# Patient Record
Sex: FEMALE | Race: WHITE | Employment: FULL TIME | RURAL
[De-identification: names, ages, dates, MRNs, and addresses within clinical notes are randomized per-mention and may not be internally consistent; named-entity substitution may affect disease eponyms.]

---

## 2017-03-02 DIAGNOSIS — H81.09 MENIERE'S DISEASE, UNSPECIFIED LATERALITY: ICD-10-CM

## 2017-03-02 RX ORDER — HYDROCHLOROTHIAZIDE 12.5 MG/1
12.5 CAPSULE ORAL DAILY
Qty: 30 CAP | Refills: 0 | Status: SHIPPED | OUTPATIENT
Start: 2017-03-02 | End: 2017-04-07

## 2017-04-03 ENCOUNTER — OFFICE VISIT (OUTPATIENT)
Dept: FAMILY MEDICINE CLINIC | Age: 37
End: 2017-04-03

## 2017-04-03 VITALS
DIASTOLIC BLOOD PRESSURE: 62 MMHG | WEIGHT: 217.2 LBS | OXYGEN SATURATION: 99 % | HEART RATE: 82 BPM | SYSTOLIC BLOOD PRESSURE: 108 MMHG | BODY MASS INDEX: 35.06 KG/M2 | RESPIRATION RATE: 18 BRPM

## 2017-04-03 DIAGNOSIS — Z83.3 FAMILY HISTORY OF DIABETES MELLITUS IN MOTHER: ICD-10-CM

## 2017-04-03 DIAGNOSIS — H69.83 EUSTACHIAN TUBE DYSFUNCTION, BILATERAL: ICD-10-CM

## 2017-04-03 DIAGNOSIS — E55.9 VITAMIN D DEFICIENCY: ICD-10-CM

## 2017-04-03 DIAGNOSIS — R79.89 ELEVATED FERRITIN: ICD-10-CM

## 2017-04-03 DIAGNOSIS — Z13.220 LIPID SCREENING: ICD-10-CM

## 2017-04-03 DIAGNOSIS — H65.03 BILATERAL ACUTE SEROUS OTITIS MEDIA, RECURRENCE NOT SPECIFIED: Primary | ICD-10-CM

## 2017-04-03 DIAGNOSIS — M13.0 POLYARTHRITIS: ICD-10-CM

## 2017-04-03 RX ORDER — TOPIRAMATE 25 MG/1
TABLET ORAL 2 TIMES DAILY
COMMUNITY
End: 2017-04-04 | Stop reason: SDUPTHER

## 2017-04-03 RX ORDER — HYDROXYCHLOROQUINE SULFATE 200 MG/1
200 TABLET, FILM COATED ORAL DAILY
COMMUNITY
End: 2019-04-04 | Stop reason: ALTCHOICE

## 2017-04-03 NOTE — PROGRESS NOTES
4/7/2017    Chief Complaint   Patient presents with    Medication Evaluation     wants to discuss vitamin d.     Medication Refill     Albuterol and Topamax    Side Pain     left sided pain that lasts all day. Has happened 3-4 times in the last few weeks. HPI: Charles Mayers is a 39 y.o. female. Has been seeing Elidia Harding. I have seen her in 2014. Presents for f/u of medication refills. Severeal concerns. She is having leg cramps. On HCTZ for Muniere's. There was having some dizziness, no hearing loss. Seen by ENT and put on HCTZ for Muniere's She does not describe true intermittent vertigo, no nausea or vomiting. Will DC HCTZ. Was having some joint pain. Rheumatology labs were low positive for Sjorgen's. She saw a rheumatologist who notes that her labs and history do not strongly support diagnosis of SLE or Sjorogens. She was started on Hydrochloroquine 200mg BID. She iss not having significant joint pain. No dry eyes or dry mouth. Will taper Hydrochloroquine cut dose in 1/2 100mg BID. On Topomax for daily headaches. Thins it may be helping. Will continu and monitor. Had low Vit D on 50,000 units weekly. Check labs. Will DC Dicyclomine and Roperinole. She has been referred to multiple specialists and given a picture of having multiple medical problems. Doubt. Will DC meds as noted and monitor. .     Encourage weight loss. She complains of fullness, decreased hearing in her ears. Some discomfort. No Known Allergies    Current Outpatient Prescriptions   Medication Sig Dispense Refill    hydroxychloroquine (PLAQUENIL) 200 mg tablet Take 0.5 Tabs by mouth two (2) times a day. 30 Tab 1    hydroxychloroquine (PLAQUENIL) 200 mg tablet Take 200 mg by mouth daily.  ergocalciferol (ERGOCALCIFEROL) 50,000 unit capsule 1 pill po twice weekly with food 24 Cap 1    GILDESS 1/20, 21, 1-20 mg-mcg tab Take 1 mg by mouth daily.       omeprazole (PRILOSEC) 20 mg capsule Take 20 mg by mouth daily as needed.  levonorgestrel-ethinyl estradiol (JOLESSA) 0.15-30 mg-mcg per tablet Take  by mouth.  albuterol (PROVENTIL HFA, VENTOLIN HFA, PROAIR HFA) 90 mcg/actuation inhaler Take 1 Puff by inhalation every four (4) hours as needed for Wheezing. 1 Inhaler 5    topiramate (TOPAMAX) 25 mg tablet Take 1 Tab by mouth two (2) times a day. 90 Tab 3    fluticasone (FLONASE) 50 mcg/actuation nasal spray 2 Sprays by Both Nostrils route daily. 3 Bottle 1    DIPHENHYDRAMINE HCL (ALLERGY MEDICATION PO) Take 1 Tab by mouth every morning.  meclizine (ANTIVERT) 25 mg tablet 1/2-1 pills po qid prn vertigo 30 Tab 4       Past Medical History:   Diagnosis Date    Asthma     Migraines     Vertigo        Lab Results   Component Value Date/Time    Hemoglobin A1c 5.2 04/03/2017 08:42 AM    Hemoglobin A1c 5.7 06/01/2016 10:58 AM    Hemoglobin A1c 5.6 08/27/2015 08:29 AM    Glucose 89 04/03/2017 08:42 AM    LDL, calculated 119 06/01/2016 10:58 AM    Creatinine 0.90 04/03/2017 08:42 AM       ROS:  Constitutional: No fever, chills or weight loss  Respiratory: No cough, SOB   CV: No chest pain or Palpitations  GI: No nausea, vomiting or diarrhea. : No dysuria or hematuria. Neuro: No headaches, seizures, change in mental status. Physical Exam:   VS Visit Vitals    /62 (BP 1 Location: Right arm, BP Patient Position: Sitting)    Pulse 82    Resp 18    Wt 217 lb 3.2 oz (98.5 kg)    SpO2 99%    BMI 35.06 kg/m2      General Alert, oriented WF. NAD. Ambulates with steady gait, unassisted. Eyes Conjunctiva and lids normal.    PERRLA, EOMI.   ENMT External ears and nose normal.  Canals normal, TMs mild retraction and vascular dilation along the umbo. No effusion or erythema. Lips, teeth, gums normal, mucous membranes moist.    Oropharynx: no erythema, no exudates, no lesions, normal tongue. NECK Thyroid: normal size, nontender.   Trachea midline, neck symmetrical and without masses. Carotids 2+ with no bruits. No enlarged nodes. RESP Clear to auscultation and percussion. No rales, wheezes, rhonchi, or rubs. CV RRR, with no S3 or S4, no murmur, no rub. GI   Normal bowel sounds, no bruit, soft, nontender, without masses. MSKEL Normal gait and station. Normal strength and tone, no atrophy. EXT No deformity. There are no joint effusions, no erythema, no tenderness, no laxity of hands MCPs, DIPs or PIPs. No tenderness of wrists, elbows or shoulders. No back tenderness or spasm. No redness, swelling or tenderness of hips, knees or feet. Extremities without edema. Bilateral hands: Tinel's is mildly positive. No thenar wasting. Opposition of thumb and 5th finger is strong. DP and PT 2+ bilaterally. SKIN Skin warm, normal turgor. No nodules or rashes. NEURO Cranial nerves normal 2-12. No abnormal movement   PSYCH Judgment and insight good. Oriented to person, place, and time. Affect is alert and attentive. 1. Bilateral acute serous otitis media, recurrence not specified  Reassure. Difficult to treat. She is using Flonase nasal spray. Continue.   - METABOLIC PANEL, COMPREHENSIVE  - AR COLLECTION VENOUS BLOOD,VENIPUNCTURE    2. Eustachian tube dysfunction, bilateral  As above   - METABOLIC PANEL, COMPREHENSIVE  - AR COLLECTION VENOUS BLOOD,VENIPUNCTURE    3. Vitamin D deficiency  Check labs   - METABOLIC PANEL, COMPREHENSIVE  - VITAMIN D, 25 HYDROXY  - AR COLLECTION VENOUS BLOOD,VENIPUNCTURE    4. Polyarthritis  Check labs   - CBC WITH AUTOMATED DIFF  - METABOLIC PANEL, COMPREHENSIVE  - AR COLLECTION VENOUS BLOOD,VENIPUNCTURE    5. Elevated ferritin  Check labs   - CBC WITH AUTOMATED DIFF  - FERRITIN  - METABOLIC PANEL, COMPREHENSIVE  - AR COLLECTION VENOUS BLOOD,VENIPUNCTURE    6. Family history of diabetes mellitus in mother  Her A1Cs have been in the 5.6-5.7%.    Normal.   Discussed the interpretation of A1C and weight management:  Use hands as guide for portion control, improve food selections and increase physical acitivey. - HEMOGLOBIN A1C WITH EAG    7. Lipid screening  Check labs. - LIPID PANEL  - NE COLLECTION VENOUS BLOOD,VENIPUNCTURE    8. Bilateral CTS  No weakness, dropping items. No muscle wasting. Reassure. Monitor. There are no diagnoses linked to this encounter. Orders Placed This Encounter    CBC WITH AUTOMATED DIFF    FERRITIN    METABOLIC PANEL, COMPREHENSIVE    LIPID PANEL    VITAMIN D, 25 HYDROXY    HEMOGLOBIN A1C WITH EAG    NE COLLECTION VENOUS BLOOD,VENIPUNCTURE    hydroxychloroquine (PLAQUENIL) 200 mg tablet     Sig: Take 200 mg by mouth daily.  DISCONTD: topiramate (TOPAMAX) 25 mg tablet     Sig: Take  by mouth two (2) times a day.  hydroxychloroquine (PLAQUENIL) 200 mg tablet     Sig: Take 0.5 Tabs by mouth two (2) times a day. Dispense:  30 Tab     Refill:  1       Follow-up Disposition:  Return in about 2 months (around 6/3/2017).         SCOTT Gill

## 2017-04-03 NOTE — MR AVS SNAPSHOT
Visit Information Date & Time Provider Department Dept. Phone Encounter #  
 4/3/2017  7:00 AM Clair Li NP [facility] [id number] Upcoming Health Maintenance Date Due DTaP/Tdap/Td series (1 - Tdap) 10/28/2000 PAP AKA CERVICAL CYTOLOGY 10/28/2000 INFLUENZA AGE 9 TO ADULT 8/1/2016 Allergies as of 4/3/2017  Review Complete On: 4/3/2017 By: Clair Li NP No Known Allergies Current Immunizations  Never Reviewed No immunizations on file. Not reviewed this visit You Were Diagnosed With   
  
 Codes Comments Bilateral acute serous otitis media, recurrence not specified    -  Primary ICD-10-CM: H65.03 
ICD-9-CM: 381.01 Eustachian tube dysfunction, bilateral     ICD-10-CM: G02.24 ICD-9-CM: 381.81 Vitamin D deficiency     ICD-10-CM: E55.9 ICD-9-CM: 268.9 Polyarthritis     ICD-10-CM: M13.0 ICD-9-CM: 716.50 Elevated ferritin     ICD-10-CM: R79.89 ICD-9-CM: 790.6 Family history of diabetes mellitus in mother     ICD-10-CM: Z80.1 ICD-9-CM: V18.0 Lipid screening     ICD-10-CM: C59.187 ICD-9-CM: V77.91 Vitals BP Pulse Resp Weight(growth percentile) SpO2 BMI  
 108/62 (BP 1 Location: Right arm, BP Patient Position: Sitting) 82 18 217 lb 3.2 oz (98.5 kg) 99% 35.06 kg/m2 OB Status Smoking Status Having regular periods Never Smoker Vitals History BMI and BSA Data Body Mass Index Body Surface Area 35.06 kg/m 2 2.14 m 2 Preferred Pharmacy Pharmacy Name [address], [facility] [phone] Your Updated Medication List  
  
   
This list is accurate as of: 4/3/17  8:49 AM.  Always use your most recent med list.  
  
  
  
  
 albuterol 90 mcg/actuation inhaler Commonly known as:  PROVENTIL HFA, VENTOLIN HFA, PROAIR HFA  
 Take 1 Puff by inhalation every four (4) hours as needed for Wheezing. ALLERGY MEDICATION PO Take 1 Tab by mouth every morning.  
  
 ergocalciferol 50,000 unit capsule Commonly known as:  ERGOCALCIFEROL 1 pill po twice weekly with food  
  
 fluticasone 50 mcg/actuation nasal spray Commonly known as:  Emmette Settles 2 Sprays by Both Nostrils route daily. GILDESS 1/20 (21) 1-20 mg-mcg Tab Generic drug:  norethindrone-ethinyl estradiol Take 1 mg by mouth daily. hydroCHLOROthiazide 12.5 mg capsule Commonly known as:  Gerianne Orquidea Take 1 Cap by mouth daily. hydroxychloroquine 200 mg tablet Commonly known as:  PLAQUENIL Take 200 mg by mouth daily. JOLESSA 0.15 mg-30 mcg 3mpk Generic drug:  levonorgestrel-ethinyl estradiol Take  by mouth.  
  
 meclizine 25 mg tablet Commonly known as:  ANTIVERT  
1/2-1 pills po qid prn vertigo  
  
 omeprazole 20 mg capsule Commonly known as:  PRILOSEC Take 20 mg by mouth daily as needed. TOPAMAX 25 mg tablet Generic drug:  topiramate Take  by mouth two (2) times a day. We Performed the Following CBC WITH AUTOMATED DIFF [56720 CPT(R)] FERRITIN [42160 CPT(R)] HEMOGLOBIN A1C WITH EAG [12365 CPT(R)] LIPID PANEL [94393 CPT(R)] METABOLIC PANEL, COMPREHENSIVE [87410 CPT(R)] RI COLLECTION VENOUS BLOOD,VENIPUNCTURE [id number] CPT(R)] VITAMIN D, 25 HYDROXY [id number] CPT(R)] [facility]! Dear Terri Snyder: Thank you for requesting a [employer] account. Our records indicate that you already have an active [employer] account. You can access your account anytime at [email or web address] Did you know that you can access your hospital and ER discharge instructions at any time in [employer]? You can also review all of your test results from your hospital stay or ER visit. Additional Information If you have questions, please visit the Frequently Asked Questions section of the [employer] website at [email or web address] Remember, [employer] is NOT to be used for urgent needs. For medical emergencies, dial 911. Now available from your iPhone and Android! Please provide this summary of care documentation to your next provider. Your primary care clinician is listed as Lizzeth Cutler. If you have any questions after today's visit, please call [phone].

## 2017-04-04 DIAGNOSIS — R06.02 SOB (SHORTNESS OF BREATH): ICD-10-CM

## 2017-04-04 LAB
ALBUMIN SERPL-MCNC: 4.4 G/DL (ref 3.5–5.5)
ALBUMIN/GLOB SERPL: 1.7 {RATIO} (ref 1.2–2.2)
ALP SERPL-CCNC: 68 IU/L (ref 39–117)
ALT SERPL-CCNC: 60 IU/L (ref 0–32)
AST SERPL-CCNC: 18 IU/L (ref 0–40)
BASOPHILS # BLD AUTO: 0 X10E3/UL (ref 0–0.2)
BASOPHILS NFR BLD AUTO: 0 %
BILIRUB SERPL-MCNC: 0.3 MG/DL (ref 0–1.2)
BUN SERPL-MCNC: 13 MG/DL (ref 6–20)
BUN/CREAT SERPL: 14 (ref 9–23)
CALCIUM SERPL-MCNC: 9.6 MG/DL (ref 8.7–10.2)
CHLORIDE SERPL-SCNC: 105 MMOL/L (ref 96–106)
CO2 SERPL-SCNC: 25 MMOL/L (ref 18–29)
CREAT SERPL-MCNC: 0.9 MG/DL (ref 0.57–1)
EOSINOPHIL # BLD AUTO: 0 X10E3/UL (ref 0–0.4)
EOSINOPHIL NFR BLD AUTO: 0 %
ERYTHROCYTE [DISTWIDTH] IN BLOOD BY AUTOMATED COUNT: 13.8 % (ref 12.3–15.4)
EST. AVERAGE GLUCOSE BLD GHB EST-MCNC: 103 MG/DL
FERRITIN SERPL-MCNC: 184 NG/ML (ref 15–150)
GLOBULIN SER CALC-MCNC: 2.6 G/DL (ref 1.5–4.5)
GLUCOSE SERPL-MCNC: 89 MG/DL (ref 65–99)
HBA1C MFR BLD: 5.2 % (ref 4.8–5.6)
HCT VFR BLD AUTO: 43.8 % (ref 34–46.6)
HGB BLD-MCNC: 14.4 G/DL (ref 11.1–15.9)
IMM GRANULOCYTES # BLD: 0 X10E3/UL (ref 0–0.1)
IMM GRANULOCYTES NFR BLD: 0 %
LYMPHOCYTES # BLD AUTO: 1.5 X10E3/UL (ref 0.7–3.1)
LYMPHOCYTES NFR BLD AUTO: 22 %
MCH RBC QN AUTO: 28 PG (ref 26.6–33)
MCHC RBC AUTO-ENTMCNC: 32.9 G/DL (ref 31.5–35.7)
MCV RBC AUTO: 85 FL (ref 79–97)
MONOCYTES # BLD AUTO: 0.5 X10E3/UL (ref 0.1–0.9)
MONOCYTES NFR BLD AUTO: 7 %
NEUTROPHILS # BLD AUTO: 4.8 X10E3/UL (ref 1.4–7)
NEUTROPHILS NFR BLD AUTO: 71 %
PLATELET # BLD AUTO: 244 X10E3/UL (ref 150–379)
POTASSIUM SERPL-SCNC: 4.8 MMOL/L (ref 3.5–5.2)
PROT SERPL-MCNC: 7 G/DL (ref 6–8.5)
RBC # BLD AUTO: 5.15 X10E6/UL (ref 3.77–5.28)
SODIUM SERPL-SCNC: 143 MMOL/L (ref 134–144)
WBC # BLD AUTO: 6.8 X10E3/UL (ref 3.4–10.8)

## 2017-04-04 RX ORDER — ALBUTEROL SULFATE 90 UG/1
1 AEROSOL, METERED RESPIRATORY (INHALATION)
Qty: 1 INHALER | Refills: 5 | Status: SHIPPED | OUTPATIENT
Start: 2017-04-04 | End: 2019-04-04 | Stop reason: SDUPTHER

## 2017-04-04 RX ORDER — TOPIRAMATE 25 MG/1
25 TABLET ORAL 2 TIMES DAILY
Qty: 90 TAB | Refills: 3 | Status: SHIPPED | OUTPATIENT
Start: 2017-04-04 | End: 2019-04-04 | Stop reason: ALTCHOICE

## 2017-04-07 RX ORDER — HYDROXYCHLOROQUINE SULFATE 200 MG/1
100 TABLET, FILM COATED ORAL 2 TIMES DAILY
Qty: 30 TAB | Refills: 1 | Status: SHIPPED | OUTPATIENT
Start: 2017-04-07 | End: 2019-04-04 | Stop reason: ALTCHOICE

## 2017-08-08 DIAGNOSIS — E55.9 VITAMIN D DEFICIENCY: ICD-10-CM

## 2017-08-10 RX ORDER — ERGOCALCIFEROL 1.25 MG/1
CAPSULE ORAL
Qty: 24 CAP | Refills: 0 | Status: SHIPPED | OUTPATIENT
Start: 2017-08-10 | End: 2019-04-04 | Stop reason: ALTCHOICE

## 2018-02-02 ENCOUNTER — TELEPHONE (OUTPATIENT)
Dept: FAMILY MEDICINE CLINIC | Age: 38
End: 2018-02-02

## 2018-02-02 ENCOUNTER — OFFICE VISIT (OUTPATIENT)
Dept: FAMILY MEDICINE CLINIC | Age: 38
End: 2018-02-02

## 2018-02-02 VITALS
TEMPERATURE: 98.6 F | HEART RATE: 74 BPM | BODY MASS INDEX: 35.52 KG/M2 | DIASTOLIC BLOOD PRESSURE: 84 MMHG | WEIGHT: 221 LBS | OXYGEN SATURATION: 99 % | RESPIRATION RATE: 18 BRPM | SYSTOLIC BLOOD PRESSURE: 131 MMHG | HEIGHT: 66 IN

## 2018-02-02 DIAGNOSIS — J00 ACUTE NASOPHARYNGITIS: ICD-10-CM

## 2018-02-02 DIAGNOSIS — R52 BODY ACHES: Primary | ICD-10-CM

## 2018-02-02 DIAGNOSIS — R05.8 DRY COUGH: ICD-10-CM

## 2018-02-02 LAB
BINAX NOW INFLUENZA: NEGATIVE
VALID INTERNAL CONTROL?: YES

## 2018-02-02 RX ORDER — PREDNISONE 20 MG/1
TABLET ORAL
Qty: 30 TAB | Refills: 0 | Status: SHIPPED | OUTPATIENT
Start: 2018-02-02 | End: 2018-07-26 | Stop reason: ALTCHOICE

## 2018-02-02 RX ORDER — IPRATROPIUM BROMIDE 42 UG/1
1 SPRAY, METERED NASAL 4 TIMES DAILY
Qty: 15 ML | Refills: 0 | Status: SHIPPED | OUTPATIENT
Start: 2018-02-02 | End: 2019-04-04 | Stop reason: ALTCHOICE

## 2018-02-02 NOTE — PROGRESS NOTES
Reshma Ramirez is a 44 y.o. female who presents with the following:  Chief Complaint   Patient presents with    Generalized Body Aches    Cough     dry       Generalized Body Aches   The history is provided by the patient (Patient with a 2 day history of body aches and coryza with cough). Pertinent negatives include no chest pain, no abdominal pain, no headaches and no shortness of breath. Cough   Pertinent negatives include no chest pain, no abdominal pain, no headaches and no shortness of breath. No Known Allergies    Current Outpatient Prescriptions   Medication Sig    predniSONE (DELTASONE) 20 mg tablet 5 tablets day for days 1 through 4, then 4 tablets on day 5, then 3 tablets on day 6, then 2 tablets on day 7, then 1 tablet on day 8.    ipratropium (ATROVENT) 42 mcg (0.06 %) nasal spray 1 [place] by Both Nostrils route four (4) times daily. Indications: Rhinorrhea    albuterol (PROVENTIL HFA, VENTOLIN HFA, PROAIR HFA) 90 mcg/actuation inhaler Take 1 Puff by inhalation every four (4) hours as needed for Wheezing.  fluticasone (FLONASE) 50 mcg/actuation nasal spray 2 Sprays by Both Nostrils route daily.  GILDESS 1/20, 21, 1-20 mg-mcg tab Take 1 mg by mouth daily.  DIPHENHYDRAMINE HCL (ALLERGY MEDICATION PO) Take 1 Tab by mouth every morning.  meclizine (ANTIVERT) 25 mg tablet 1/2-1 pills po qid prn vertigo    VITAMIN D2 50,000 unit capsule TAKE 1 CAPSULE TWICE A WEEK WITH FOOD    hydroxychloroquine (PLAQUENIL) 200 mg tablet Take 0.5 Tabs by mouth two (2) times a day.  topiramate (TOPAMAX) 25 mg tablet Take 1 Tab by mouth two (2) times a day.  hydroxychloroquine (PLAQUENIL) 200 mg tablet Take 200 mg by mouth daily.  omeprazole (PRILOSEC) 20 mg capsule Take 20 mg by mouth daily as needed.  levonorgestrel-ethinyl estradiol (JOLESSA) 0.15-30 mg-mcg per tablet Take  by mouth. No current facility-administered medications for this visit.         Past Medical History:   Diagnosis Date    Asthma     Migraines     Vertigo        Past Surgical History:   Procedure Laterality Date    HX BREAST REDUCTION         Family History   Problem Relation Age of Onset    Diabetes Brother     No Known Problems Mother     No Known Problems Father        Social History     Social History    Marital status:      Spouse name: N/A    Number of children: N/A    Years of education: N/A     Social History Main Topics    Smoking status: Never Smoker    Smokeless tobacco: Never Used    Alcohol use No    Drug use: None    Sexual activity: Not Asked     Other Topics Concern    None     Social History Narrative       Review of Systems   Constitutional: Positive for malaise/fatigue. Negative for chills and fever. HENT: Positive for congestion and sore throat. Negative for ear discharge, ear pain, hearing loss, nosebleeds and tinnitus. Eyes: Negative for blurred vision, double vision, photophobia and discharge. Respiratory: Positive for cough. Negative for sputum production, shortness of breath, wheezing and stridor. Cardiovascular: Negative for chest pain, palpitations, orthopnea and PND. Gastrointestinal: Negative for abdominal pain, diarrhea, heartburn, nausea and vomiting. Genitourinary: Negative for dysuria, frequency and urgency. Musculoskeletal: Negative for myalgias and neck pain. Skin: Negative for itching and rash. Neurological: Negative for dizziness, tingling and headaches. Endo/Heme/Allergies: Does not bruise/bleed easily. Psychiatric/Behavioral: Negative for depression. The patient has insomnia. The patient is not nervous/anxious.         Visit Vitals    /84 (BP 1 Location: Left arm, BP Patient Position: Sitting)    Pulse 74    Temp 98.6 °F (37 °C) (Oral)    Resp 18    Ht 5' 6\" (1.676 m)    Wt 221 lb (100.2 kg)    LMP 01/25/2018 (Exact Date)    SpO2 99%    BMI 35.67 kg/m2     Physical Exam   Constitutional: She is oriented to person, place, and time. No distress. Has coryza that is clear - mild distress   HENT:   Head: Normocephalic and atraumatic. Right Ear: External ear normal.   Left Ear: External ear normal.   Mouth/Throat: No oropharyngeal exudate. Red mm's in the nose and tht   Eyes: Conjunctivae and EOM are normal. Pupils are equal, round, and reactive to light. Right eye exhibits no discharge. Left eye exhibits no discharge. Neck: Normal range of motion. Neck supple. No tracheal deviation present. No thyromegaly present. Cardiovascular: Normal rate, regular rhythm, normal heart sounds and intact distal pulses. Exam reveals no gallop and no friction rub. No murmur heard. Pulmonary/Chest: Effort normal and breath sounds normal. No stridor. No respiratory distress. She has no wheezes. She has no rales. She exhibits no tenderness. Abdominal: She exhibits no distension and no mass. There is no tenderness. There is no guarding. Musculoskeletal: She exhibits no edema or tenderness. Lymphadenopathy:     She has no cervical adenopathy. Neurological: She is alert and oriented to person, place, and time. She has normal reflexes. Gait normal.   Skin: Skin is warm and dry. Rash (Patient with a port wine stain on the left side of her neck) noted. She is not diaphoretic. No erythema. Psychiatric: Mood, memory, affect and judgment normal.         ICD-10-CM ICD-9-CM    1. Body aches R52 780.96 AMB POC BINAX NOW INFLUENZA TEST   2. Dry cough R05 786.2 AMB POC BINAX NOW INFLUENZA TEST   3. Acute nasopharyngitis J00 460        Orders Placed This Encounter    AMB POC BINAX NOW INFLUENZA TEST    predniSONE (DELTASONE) 20 mg tablet     Si tablets day for days 1 through 4, then 4 tablets on day 5, then 3 tablets on day 6, then 2 tablets on day 7, then 1 tablet on day 8. Dispense:  30 Tab     Refill:  0    ipratropium (ATROVENT) 42 mcg (0.06 %) nasal spray     [place] by Both Nostrils route four (4) times daily.  Indications: Rhinorrhea     Dispense:  15 mL     Refill:  0       Follow-up Disposition: Not on Barrett Zamorano MD

## 2018-02-02 NOTE — TELEPHONE ENCOUNTER
Ins will not cover. Patient will have to pay for OTC or out of pocket. LVM for patient to return call.

## 2018-02-02 NOTE — MR AVS SNAPSHOT
[address] [place] [address] [phone] Patient: Farideh Bojorquez MRN: [id number] :1980 Visit Information Date & Time Provider Department Dept. Phone Encounter #  
 2018  3:40 PM Bladimir Dewitt MD Deya Carney [id number] Upcoming Health Maintenance Date Due DTaP/Tdap/Td series (1 - Tdap) 10/28/2000 PAP AKA CERVICAL CYTOLOGY 10/28/2000 Allergies as of 2018  Review Complete On: 2018 By: Bladimir Dewitt MD  
 No Known Allergies Current Immunizations  Never Reviewed No immunizations on file. Not reviewed this visit You Were Diagnosed With   
  
 Codes Comments Body aches    -  Primary ICD-10-CM: Y50 ICD-9-CM: 780.96 Dry cough     ICD-10-CM: R05 ICD-9-CM: 296. 2 Vitals BP Pulse Temp Resp Height(growth percentile) Weight(growth percentile) 131/84 (BP 1 Location: Left arm, BP Patient Position: Sitting) 74 98.6 °F (37 °C) (Oral) 18 5' 6\" (1.676 m) 221 lb (100.2 kg) LMP SpO2 BMI OB Status Smoking Status 2018 (Exact Date) 99% 35.67 kg/m2 Having regular periods Never Smoker Vitals History BMI and BSA Data Body Mass Index Body Surface Area  
 35.67 kg/m 2 2.16 m 2 Preferred Pharmacy Pharmacy Name Phone [address], [facility] [phone] Your Updated Medication List  
  
   
This list is accurate as of: 18  4:05 PM.  Always use your most recent med list.  
  
  
  
  
 albuterol 90 mcg/actuation inhaler Commonly known as:  PROVENTIL HFA, VENTOLIN HFA, PROAIR HFA Take 1 Puff by inhalation every four (4) hours as needed for Wheezing. ALLERGY MEDICATION PO Take 1 Tab by mouth every morning. fluticasone 50 mcg/actuation nasal spray Commonly known as:  Preston [place] 2 Sprays by Both Nostrils route daily. GILDESS 1/20 (21) 1-20 mg-mcg Tab Generic drug:  norethindrone-ethinyl estradiol Take 1 mg by mouth daily. * hydroxychloroquine 200 mg tablet Commonly known as:  PLAQUENIL Take 200 mg by mouth daily. * hydroxychloroquine 200 mg tablet Commonly known as:  PLAQUENIL Take 0.5 Tabs by mouth two (2) times a day. JOLESSA 0.15 mg-30 mcg 3mpk Generic drug:  levonorgestrel-ethinyl estradiol Take  by mouth.  
  
 meclizine 25 mg tablet Commonly known as:  ANTIVERT  
1/2-1 pills po qid prn vertigo  
  
 omeprazole 20 mg capsule Commonly known as:  PRILOSEC Take 20 mg by mouth daily as needed. topiramate 25 mg tablet Commonly known as:  TOPAMAX Take 1 Tab by mouth two (2) times a day. VITAMIN D2 50,000 unit capsule Generic drug:  ergocalciferol TAKE 1 CAPSULE TWICE A WEEK WITH FOOD * Notice: This list has 2 medication(s) that are the same as other medications prescribed for you. Read the directions carefully, and ask your doctor or other care provider to review them with you. We Performed the Following AMB POC BINAX NOW INFLUENZA TEST [85317 CPT(R)] Introducing [facility]! Dear Leanne De La Cruz: 
Thank you for requesting a [employer] account. Our records indicate that you already have an active [employer] account. You can access your account anytime at [email or web address] Did you know that you can access your hospital and ER discharge instructions at any time in [employer]? You can also review all of your test results from your hospital stay or ER visit. Additional Information If you have questions, please visit the Frequently Asked Questions section of the [employer] website at [email or web address] Remember, [employer] is NOT to be used for urgent needs. For medical emergencies, dial 911. Now available from your iPhone and Android! Please provide this summary of care documentation to your next provider. Your primary care clinician is listed as Damari Luevano. If you have any questions after today's visit, please call [phone].

## 2018-07-26 ENCOUNTER — OFFICE VISIT (OUTPATIENT)
Dept: FAMILY MEDICINE CLINIC | Age: 38
End: 2018-07-26

## 2018-07-26 VITALS
DIASTOLIC BLOOD PRESSURE: 78 MMHG | HEART RATE: 80 BPM | RESPIRATION RATE: 18 BRPM | BODY MASS INDEX: 34.78 KG/M2 | SYSTOLIC BLOOD PRESSURE: 119 MMHG | HEIGHT: 66 IN | WEIGHT: 216.4 LBS | OXYGEN SATURATION: 98 %

## 2018-07-26 DIAGNOSIS — M54.2 NECK PAIN: Primary | ICD-10-CM

## 2018-07-26 DIAGNOSIS — M50.90 CERVICAL DISC DISEASE: ICD-10-CM

## 2018-07-26 DIAGNOSIS — K21.9 GASTROESOPHAGEAL REFLUX DISEASE WITHOUT ESOPHAGITIS: ICD-10-CM

## 2018-07-26 RX ORDER — PREDNISONE 20 MG/1
TABLET ORAL
Qty: 30 TAB | Refills: 0 | Status: SHIPPED | OUTPATIENT
Start: 2018-07-26 | End: 2018-08-03 | Stop reason: ALTCHOICE

## 2018-07-26 RX ORDER — OMEPRAZOLE 40 MG/1
40 CAPSULE, DELAYED RELEASE ORAL
Qty: 30 CAP | Refills: 0 | Status: SHIPPED | OUTPATIENT
Start: 2018-07-26 | End: 2019-04-04 | Stop reason: ALTCHOICE

## 2018-07-26 NOTE — MR AVS SNAPSHOT
[address] [phone] Patient: Marah Bhatia MRN: [id number] :1980 Visit Information Date & Time Provider Department Dept. Phone Encounter #  
 2018  2:00 PM Amparo Corbin  [address] [facility] [phone] [id number] Upcoming Health Maintenance Date Due Influenza Age 5 to Adult 2018 PAP AKA CERVICAL CYTOLOGY 2021 DTaP/Tdap/Td series (2 - Td) 2028 Allergies as of 2018  Review Complete On: 2018 By: Amparo Corbin MD  
 No Known Allergies Current Immunizations  Never Reviewed No immunizations on file. Not reviewed this visit You Were Diagnosed With   
  
 Codes Comments Neck pain    -  Primary ICD-10-CM: M54.2 ICD-9-CM: 723.1 Cervical disc disease     ICD-10-CM: M50.90 ICD-9-CM: 722.91 Gastroesophageal reflux disease without esophagitis     ICD-10-CM: K21.9 ICD-9-CM: 530.81 Vitals BP Pulse Resp Height(growth percentile) Weight(growth percentile) LMP  
 119/78 (BP 1 Location: Left arm, BP Patient Position: Sitting) 80 18 5' 6\" (1.676 m) 216 lb 6.4 oz (98.2 kg) 07/15/2018 SpO2 BMI OB Status Smoking Status 98% 34.93 kg/m2 Having regular periods Never Smoker Vitals History BMI and BSA Data Body Mass Index Body Surface Area 34.93 kg/m 2 2.14 m 2 [facility]erred [facility] [address] [address] [facility] [phone] Your Updated Medication List  
  
   
This list is accurate as of 18  2:41 PM.  Always use your most recent med list.  
  
  
  
  
 albuterol 90 mcg/actuation inhaler Commonly known as:  PROVENTIL HFA, VENTOLIN HFA, PROAIR HFA Take 1 Puff by inhalation every four (4) hours as needed for Wheezing. ALLERGY MEDICATION PO Take 1 Tab by mouth every morning. fluticasone 50 mcg/actuation nasal spray Commonly known as:  [place]arsh Fails 2 Sprays by Both Nostrils route daily. GILDESS  (21) 1-20 mg-mcg Tab Generic drug:  norethindrone-ethinyl estradiol Take 1 mg by mouth daily. * hydroxychloroquine 200 mg tablet Commonly known as:  PLAQUENIL Take 200 mg by mouth daily. * hydroxychloroquine 200 mg tablet Commonly known as:  PLAQUENIL Take 0.5 Tabs by mouth two (2) times a day. ipratropium 42 mcg (0.06 %) nasal spray Commonly known as:  ATROVENT  
1 [place] by Both Nostrils route four (4) times daily. Indications: Rhinorrhea JOLESSA 0.15 mg-30 mcg 3mpk Generic drug:  levonorgestrel-ethinyl estradiol Take  by mouth.  
  
 meclizine 25 mg tablet Commonly known as:  ANTIVERT  
1/2-1 pills po qid prn vertigo  
  
 omeprazole 40 mg capsule Commonly known as:  PRILOSEC Take 1 Cap by mouth daily as needed. predniSONE 20 mg tablet Commonly known as:  DELTASONE  
5 tablets day for days 1 through 4, then 4 tablets on day 5, then 3 tablets on day 6, then 2 tablets on day 7, then 1 tablet on day 8.  
  
 topiramate 25 mg tablet Commonly known as:  TOPAMAX Take 1 Tab by mouth two (2) times a day. VITAMIN D2 50,000 unit capsule Generic drug:  ergocalciferol TAKE 1 CAPSULE TWICE A WEEK WITH FOOD * Notice: This list has 2 medication(s) that are the same as other medications prescribed for you. Read the directions carefully, and ask your doctor or other care provider to review them with you. Prescriptions Sent to Pharmacy Refills  
 omeprazole (PRILOSEC) 40 mg capsule 0 Sig: Take 1 Cap by mouth daily as needed. Class: Normal  
 Pharmacy: [facility] [address] [address] [address] Ph #: [phone] Route: Oral  
 predniSONE (DELTASONE) 20 mg tablet 0  Si tablets day for days 1 through 4, then 4 tablets on day 5, then 3 tablets on day 6, then 2 tablets on day 7, then 1 tablet on day 8. Class: Normal  
 Pharmacy: [address] [facility] [address] Ph #: [phone] We Performed the Following JEOVANY BY MULTIPLEX FLOW IA, QL [53417 CPT(R)] CBC WITH AUTOMATED DIFF [06002 CPT(R)] COLLECTION VENOUS BLOOD,VENIPUNCTURE [id number] CPT(R)] RHEUMATOID FACTOR, QL [id number] CPT(R)] SED RATE (ESR) [id number] CPT(R)] To-Do List   
 08/26/2018 Imaging:  XR SPINE CERV 4 OR 5 V Introducing [facility] SERVICES! Dear Kathleen Craft: 
Thank you for requesting a Skysheet account. Our records indicate that you already have an active Skysheet account. You can access your account anytime at [email or web address] Did you know that you can access your hospital and ER discharge instructions at any time in Skysheet? You can also review all of your test results from your hospital stay or ER visit. Additional Information If you have questions, please visit the Frequently Asked Questions section of the Skysheet website at [email or web address] Remember, Skysheet is NOT to be used for urgent needs. For medical emergencies, dial 911. Now available from your iPhone and Android! Please provide this summary of care documentation to your next provider. Your primary care clinician is listed as Ita Clark. If you have any questions after today's visit, please call [phone].

## 2018-07-26 NOTE — PROGRESS NOTES
Lea Maharaj is a 44 y.o. female who presents with the following:  Chief Complaint   Patient presents with    Stiff Neck     3 wks, gotten worse       Stiff Neck    The history is provided by the patient Mehnazyuly Gibbons with a tentative diagnosis of lupus and a family history of spinal disc disease is coming in with a 3 week history of increasing neck pain. This is radiating to her shoulders. ). Associated with: The patient's neck pain is achy and is at a 4/10 level. The pain is present in the generalized neck. The quality of the pain is described as aching. The pain radiates to the left shoulder and right shoulder. The pain is at a severity of 4/10. The pain is the same all the time. Associated symptoms include tingling. Pertinent negatives include no chest pain, no weight loss and no headaches. She has tried heat and ice for the symptoms. The treatment provided no relief. No Known Allergies    Current Outpatient Prescriptions   Medication Sig    omeprazole (PRILOSEC) 40 mg capsule Take 1 Cap by mouth daily as needed.  predniSONE (DELTASONE) 20 mg tablet 5 tablets day for days 1 through 4, then 4 tablets on day 5, then 3 tablets on day 6, then 2 tablets on day 7, then 1 tablet on day 8.    albuterol (PROVENTIL HFA, VENTOLIN HFA, PROAIR HFA) 90 mcg/actuation inhaler Take 1 Puff by inhalation every four (4) hours as needed for Wheezing.  meclizine (ANTIVERT) 25 mg tablet 1/2-1 pills po qid prn vertigo    levonorgestrel-ethinyl estradiol (Geovanna Farrell) 0.15-30 mg-mcg per tablet Take  by mouth.  ipratropium (ATROVENT) 42 mcg (0.06 %) nasal spray 1 [place] by Both Nostrils route four (4) times daily. Indications: Rhinorrhea    VITAMIN D2 50,000 unit capsule TAKE 1 CAPSULE TWICE A WEEK WITH FOOD    hydroxychloroquine (PLAQUENIL) 200 mg tablet Take 0.5 Tabs by mouth two (2) times a day.  topiramate (TOPAMAX) 25 mg tablet Take 1 Tab by mouth two (2) times a day.     hydroxychloroquine (PLAQUENIL) 200 mg tablet Take 200 mg by mouth daily.  fluticasone (FLONASE) 50 mcg/actuation nasal spray 2 Sprays by Both Nostrils route daily.  GILDESS 1/20, 21, 1-20 mg-mcg tab Take 1 mg by mouth daily.  DIPHENHYDRAMINE HCL (ALLERGY MEDICATION PO) Take 1 Tab by mouth every morning. No current facility-administered medications for this visit. Past Medical History:   Diagnosis Date    Asthma     Migraines     Vertigo        Past Surgical History:   Procedure Laterality Date    HX BREAST REDUCTION         Family History   Problem Relation Age of Onset    Diabetes Brother     No Known Problems Mother     No Known Problems Father        Social History     Social History    Marital status:      Spouse name: N/A    Number of children: N/A    Years of education: N/A     Social History Main Topics    Smoking status: Never Smoker    Smokeless tobacco: Never Used    Alcohol use No    Drug use: None    Sexual activity: Not Asked     Other Topics Concern    None     Social History Narrative       Review of Systems   Constitutional: Negative for chills, fever, malaise/fatigue and weight loss. HENT: Negative for congestion, hearing loss, sore throat and tinnitus. Eyes: Negative for blurred vision, pain and discharge. Respiratory: Negative for cough, shortness of breath and wheezing. Cardiovascular: Negative for chest pain, palpitations, orthopnea, claudication and leg swelling. Gastrointestinal: Negative for abdominal pain, constipation and heartburn. Genitourinary: Negative for dysuria, frequency and urgency. Musculoskeletal: Positive for neck stiffness. Negative for falls, joint pain and myalgias. Skin: Negative for itching and rash. Birthmark on the left side of her neck   Neurological: Positive for tingling. Negative for dizziness, tremors and headaches. Endo/Heme/Allergies: Negative for environmental allergies and polydipsia.    Psychiatric/Behavioral: Negative for depression and substance abuse. The patient is not nervous/anxious. Visit Vitals    /78 (BP 1 Location: Left arm, BP Patient Position: Sitting)    Pulse 80    Resp 18    Ht 5' 6\" (1.676 m)    Wt 216 lb 6.4 oz (98.2 kg)    LMP 07/15/2018    SpO2 98%    BMI 34.93 kg/m2     Physical Exam   Constitutional: She is oriented to person, place, and time and well-developed, well-nourished, and in no distress. HENT:   Head: Normocephalic and atraumatic. Right Ear: External ear normal.   Left Ear: External ear normal.   Mouth/Throat: Oropharynx is clear and moist.   Eyes: Conjunctivae and EOM are normal. Pupils are equal, round, and reactive to light. Right eye exhibits no discharge. Left eye exhibits no discharge. Neck: Neck supple. No tracheal deviation present. No thyromegaly present. Cardiovascular: Normal rate, regular rhythm, normal heart sounds and intact distal pulses. Exam reveals no gallop and no friction rub. No murmur heard. Pulmonary/Chest: Effort normal and breath sounds normal. No respiratory distress. She has no wheezes. She exhibits no tenderness. Abdominal: Soft. Bowel sounds are normal. She exhibits no distension and no mass. There is no tenderness. There is no rebound and no guarding. Musculoskeletal: She exhibits tenderness (Patient is tender from about C3-4 down to C7 and pressure at C3-4 causes her to have a tingly feeling down her arms and down to her legs. ). She exhibits no edema or deformity. Lymphadenopathy:     She has no cervical adenopathy. Neurological: She is alert and oriented to person, place, and time. She has normal reflexes. No cranial nerve deficit. She exhibits normal muscle tone. Gait normal. Coordination normal.   Skin: Skin is warm and dry. No rash noted. No erythema. No pallor. Psychiatric: Mood, memory, affect and judgment normal.         ICD-10-CM ICD-9-CM    1.  Neck pain M54.2 723.1 JEOVANY BY MULTIPLEX FLOW IA, QL      SED RATE (ESR)      COLLECTION VENOUS BLOOD,VENIPUNCTURE      CBC WITH AUTOMATED DIFF      RHEUMATOID FACTOR, QL      XR SPINE CERV 4 OR 5 V      predniSONE (DELTASONE) 20 mg tablet      REFERRAL TO PHYSICAL THERAPY   2. Cervical disc disease M50.90 722.91 XR SPINE CERV 4 OR 5 V      predniSONE (DELTASONE) 20 mg tablet      REFERRAL TO PHYSICAL THERAPY   3. Gastroesophageal reflux disease without esophagitis K21.9 530.81 omeprazole (PRILOSEC) 40 mg capsule       Orders Placed This Encounter    XR SPINE CERV 4 OR 5 V     Standing Status:   Future     Standing Expiration Date:   2019     Order Specific Question:   Reason for Exam     Answer: Increasing cervical pain over the past 3 weeks and patient with family history of cervical lumbar disc disease     Order Specific Question:   Is Patient Pregnant? Answer:   No    JEOVANY BY MULTIPLEX FLOW IA, QL    SED RATE (ESR)    CBC WITH AUTOMATED DIFF    RHEUMATOID FACTOR, QL    REFERRAL TO PHYSICAL THERAPY     Referral Priority:   Routine     Referral Type:   PT/OT/ST     Referral Reason:   Specialty Services Required    COLLECTION VENOUS BLOOD,VENIPUNCTURE    omeprazole (PRILOSEC) 40 mg capsule     Sig: Take 1 Cap by mouth daily as needed. Dispense:  30 Cap     Refill:  0    predniSONE (DELTASONE) 20 mg tablet     Si tablets day for days 1 through 4, then 4 tablets on day 5, then 3 tablets on day 6, then 2 tablets on day 7, then 1 tablet on day 8. Dispense:  30 Tab     Refill:  0   I would like to start the patient on physical therapy Tuesday if she is not doing any better.     Follow-up Disposition: Not on Terrance Whitaker MD

## 2018-07-27 ENCOUNTER — DOCUMENTATION ONLY (OUTPATIENT)
Dept: FAMILY MEDICINE CLINIC | Age: 38
End: 2018-07-27

## 2018-07-27 LAB
ANA SER QL: POSITIVE
BASOPHILS # BLD AUTO: 0 X10E3/UL (ref 0–0.2)
BASOPHILS NFR BLD AUTO: 0 %
EOSINOPHIL # BLD AUTO: 0.1 X10E3/UL (ref 0–0.4)
EOSINOPHIL NFR BLD AUTO: 1 %
ERYTHROCYTE [DISTWIDTH] IN BLOOD BY AUTOMATED COUNT: 14.1 % (ref 12.3–15.4)
ERYTHROCYTE [SEDIMENTATION RATE] IN BLOOD BY WESTERGREN METHOD: 15 MM/HR (ref 0–32)
HCT VFR BLD AUTO: 40.5 % (ref 34–46.6)
HGB BLD-MCNC: 13.7 G/DL (ref 11.1–15.9)
IMM GRANULOCYTES # BLD: 0 X10E3/UL (ref 0–0.1)
IMM GRANULOCYTES NFR BLD: 0 %
LYMPHOCYTES # BLD AUTO: 1.9 X10E3/UL (ref 0.7–3.1)
LYMPHOCYTES NFR BLD AUTO: 27 %
MCH RBC QN AUTO: 28 PG (ref 26.6–33)
MCHC RBC AUTO-ENTMCNC: 33.8 G/DL (ref 31.5–35.7)
MCV RBC AUTO: 83 FL (ref 79–97)
MONOCYTES # BLD AUTO: 0.5 X10E3/UL (ref 0.1–0.9)
MONOCYTES NFR BLD AUTO: 7 %
NEUTROPHILS # BLD AUTO: 4.4 X10E3/UL (ref 1.4–7)
NEUTROPHILS NFR BLD AUTO: 65 %
PLATELET # BLD AUTO: 272 X10E3/UL (ref 150–379)
RBC # BLD AUTO: 4.89 X10E6/UL (ref 3.77–5.28)
RHEUMATOID FACT SERPL-ACNC: <10 IU/ML (ref 0–13.9)
WBC # BLD AUTO: 6.8 X10E3/UL (ref 3.4–10.8)

## 2018-07-27 NOTE — PROGRESS NOTES
Called [facility] and spoke with Magnolia Gilberto. And added the JEOVANY with reflex cascade. Will call patient once these results come in.

## 2018-08-01 LAB
ANA SER QL: POSITIVE
CHROMATIN AB SERPL-ACNC: <0.2 AI (ref 0–0.9)
DSDNA AB SER-ACNC: <1 IU/ML (ref 0–9)
ENA RNP AB SER-ACNC: <0.2 AI (ref 0–0.9)
ENA SM AB SER-ACNC: <0.2 AI (ref 0–0.9)
ENA SM+RNP AB SER-ACNC: <0.2 AI (ref 0–0.9)
ENA SS-A AB SER-ACNC: >8 AI (ref 0–0.9)
SEE BELOW:, 164879: ABNORMAL
SPECIMEN STATUS REPORT, ROLRST: NORMAL

## 2018-08-03 ENCOUNTER — OFFICE VISIT (OUTPATIENT)
Dept: FAMILY MEDICINE CLINIC | Age: 38
End: 2018-08-03

## 2018-08-03 VITALS
HEIGHT: 66 IN | HEART RATE: 89 BPM | SYSTOLIC BLOOD PRESSURE: 131 MMHG | BODY MASS INDEX: 34.45 KG/M2 | TEMPERATURE: 98.4 F | OXYGEN SATURATION: 98 % | DIASTOLIC BLOOD PRESSURE: 76 MMHG | WEIGHT: 214.38 LBS

## 2018-08-03 DIAGNOSIS — J45.21 MILD INTERMITTENT ASTHMA WITH ACUTE EXACERBATION: Primary | ICD-10-CM

## 2018-08-03 DIAGNOSIS — A69.20 LYME DISEASE: ICD-10-CM

## 2018-08-03 RX ORDER — PREDNISONE 20 MG/1
TABLET ORAL
Qty: 15 TAB | Refills: 0 | Status: SHIPPED | OUTPATIENT
Start: 2018-08-03 | End: 2019-04-04 | Stop reason: ALTCHOICE

## 2018-08-03 RX ORDER — AMOXICILLIN 500 MG/1
1000 CAPSULE ORAL 3 TIMES DAILY
Qty: 60 CAP | Refills: 0 | Status: SHIPPED | OUTPATIENT
Start: 2018-08-03 | End: 2019-04-04 | Stop reason: ALTCHOICE

## 2018-08-03 NOTE — MR AVS SNAPSHOT
[address] [place] [phone] Patient: Margaret Siegel MRN: [id number] :1980 Visit Information Date & Time Provider Department Dept. Phone Encounter #  
 8/3/2018  8:40 AM Lucho Rousseau MD [address] [place] [address] [id number] Upcoming Health Maintenance Date Due Influenza Age 5 to Adult 2018 PAP AKA CERVICAL CYTOLOGY 2021 DTaP/Tdap/Td series (2 - Td) 2028 Allergies as of 8/3/2018  Review Complete On: 8/3/2018 By: Lucho Rousseau MD  
 No Known Allergies Current Immunizations  Never Reviewed No immunizations on file. Not reviewed this visit Vitals BP Pulse Temp Height(growth percentile) Weight(growth percentile) LMP  
 131/76 (BP 1 Location: Left arm, BP Patient Position: Sitting) 89 98.4 °F (36.9 °C) (Oral) 5' 6\" (1.676 m) 214 lb 6 oz (97.2 kg) 07/15/2018 SpO2 BMI OB Status Smoking Status 98% 34.6 kg/m2 Having regular periods Never Smoker Vitals History BMI and BSA Data Body Mass Index Body Surface Area  
 34.6 kg/m 2 2.13 m 2 Preferred Pharmacy Pharmacy Name Phone [address] [phone] Your Updated Medication List  
  
   
This list is accurate as of 8/3/18  9:08 AM.  Always use your most recent med list.  
  
  
  
  
 albuterol 90 mcg/actuation inhaler Commonly known as:  PROVENTIL HFA, VENTOLIN HFA, PROAIR HFA Take 1 Puff by inhalation every four (4) hours as needed for Wheezing. ALLERGY MEDICATION PO Take 1 Tab by mouth every morning. fluticasone 50 mcg/actuation nasal spray Commonly known as:  Alric Eaves 2 Sprays by Both Nostrils route daily. GILDESS  (21) 1-20 mg-mcg Tab Generic drug:  norethindrone-ethinyl estradiol Take 1 mg by mouth daily. * hydroxychloroquine 200 mg tablet Commonly known as:  PLAQUENIL Take 200 mg by mouth daily. * hydroxychloroquine 200 mg tablet Commonly known as:  PLAQUENIL Take 0.5 Tabs by mouth two (2) times a day. ipratropium 42 mcg (0.06 %) nasal spray Commonly known as:  ATROVENT  
1 [place] by Both Nostrils route four (4) times daily. Indications: Rhinorrhea JOLESSA 0.15 mg-30 mcg 3mpk Generic drug:  levonorgestrel-ethinyl estradiol Take  by mouth.  
  
 meclizine 25 mg tablet Commonly known as:  ANTIVERT  
1/2-1 pills po qid prn vertigo  
  
 omeprazole 40 mg capsule Commonly known as:  PRILOSEC Take 1 Cap by mouth daily as needed. topiramate 25 mg tablet Commonly known as:  TOPAMAX Take 1 Tab by mouth two (2) times a day. VITAMIN D2 50,000 unit capsule Generic drug:  ergocalciferol TAKE 1 CAPSULE TWICE A WEEK WITH FOOD * Notice: This list has 2 medication(s) that are the same as other medications prescribed for you. Read the directions carefully, and ask your doctor or other care provider to review them with you. Introducing [facility] & HEALTH SERVICES! Dear Austen Ac: 
Thank you for requesting a Heysan account. Our records indicate that you have previously registered for a Heysan account but its currently inactive. Please call our Heysan support line at [phone]. Additional Information If you have questions, please visit the Frequently Asked Questions section of the Heysan website at [email or web address] Remember, Heysan is NOT to be used for urgent needs. For medical emergencies, dial 911. Now available from your iPhone and Android! Please provide this summary of care documentation to your next provider. Your primary care clinician is listed as Lucho Chandler. If you have any questions after today's visit, please call [phone].

## 2018-08-03 NOTE — PROGRESS NOTES
Nora Mcgowan is a 44 y.o. female who presents with the following:  Chief Complaint   Patient presents with    Fatigue    Dizziness    Shortness of Breath       Fatigue   The history is provided by the patient (Patient was bitten on her back by a tick about 2 weeks ago and her  noted that there was a large red area about it when he pulled it off. It did not itch and it did not hurt. Now the patient feels just worn out and fatigued and her asthma has wors). Associated symptoms include shortness of breath. Pertinent negatives include no chest pain, no abdominal pain and no headaches. Dizziness    The history is provided by the patient (Patient's vertigo has worsened lately but she has meclizine for it which helps. ). Associated symptoms include malaise/fatigue and dizziness. Pertinent negatives include no chest pain, no abdominal pain and no headaches. Shortness of Breath   The history is provided by the patient (Patient states the albuterol has been helping the shortness of breath and wheezing. ). Associated symptoms include wheezing. Pertinent negatives include no headaches, no chest pain, no abdominal pain and no rash. No Known Allergies    Current Outpatient Prescriptions   Medication Sig    amoxicillin (AMOXIL) 500 mg capsule Take 2 Caps by mouth three (3) times daily.  predniSONE (DELTASONE) 20 mg tablet Take 5 tablets day one, take 4 tablets day two, take 3 tablets day three, take 2 tablets day four, take 1 tablet day five.  meclizine (ANTIVERT) 25 mg tablet 1/2-1 pills po qid prn vertigo    levonorgestrel-ethinyl estradiol (Darrian Juarez) 0.15-30 mg-mcg per tablet Take  by mouth.  omeprazole (PRILOSEC) 40 mg capsule Take 1 Cap by mouth daily as needed.  ipratropium (ATROVENT) 42 mcg (0.06 %) nasal spray 1 [place] by Both Nostrils route four (4) times daily.  Indications: Rhinorrhea    VITAMIN D2 50,000 unit capsule TAKE 1 CAPSULE TWICE A WEEK WITH FOOD    hydroxychloroquine (PLAQUENIL) 200 mg tablet Take 0.5 Tabs by mouth two (2) times a day.  albuterol (PROVENTIL HFA, VENTOLIN HFA, PROAIR HFA) 90 mcg/actuation inhaler Take 1 Puff by inhalation every four (4) hours as needed for Wheezing.  topiramate (TOPAMAX) 25 mg tablet Take 1 Tab by mouth two (2) times a day.  hydroxychloroquine (PLAQUENIL) 200 mg tablet Take 200 mg by mouth daily.  fluticasone (FLONASE) 50 mcg/actuation nasal spray 2 Sprays by Both Nostrils route daily.  GILDESS 1/20, 21, 1-20 mg-mcg tab Take 1 mg by mouth daily.  DIPHENHYDRAMINE HCL (ALLERGY MEDICATION PO) Take 1 Tab by mouth every morning. No current facility-administered medications for this visit. Past Medical History:   Diagnosis Date    Asthma     Migraines     Vertigo        Past Surgical History:   Procedure Laterality Date    HX BREAST REDUCTION         Family History   Problem Relation Age of Onset    Diabetes Brother     No Known Problems Mother     No Known Problems Father        Social History     Social History    Marital status:      Spouse name: N/A    Number of children: N/A    Years of education: N/A     Social History Main Topics    Smoking status: Never Smoker    Smokeless tobacco: Never Used    Alcohol use No    Drug use: None    Sexual activity: Not Asked     Other Topics Concern    None     Social History Narrative       Review of Systems   Constitutional: Positive for fatigue and malaise/fatigue. Respiratory: Positive for shortness of breath and wheezing. Cardiovascular: Negative for chest pain. Gastrointestinal: Negative for abdominal pain. Musculoskeletal: Negative for myalgias. Skin: Negative for itching and rash. Neurological: Positive for dizziness. Negative for headaches.        Visit Vitals    /76 (BP 1 Location: Left arm, BP Patient Position: Sitting)    Pulse 89    Temp 98.4 °F (36.9 °C) (Oral)    Ht 5' 6\" (1.676 m)    Wt 214 lb 6 oz (97.2 kg)    LMP 07/15/2018    SpO2 98%    BMI 34.6 kg/m2     Physical Exam   Constitutional: She is oriented to person, place, and time and well-developed, well-nourished, and in no distress. HENT:   Head: Normocephalic and atraumatic. Right Ear: External ear normal.   Left Ear: External ear normal.   Mouth/Throat: Oropharynx is clear and moist.   Eyes: Conjunctivae and EOM are normal. Pupils are equal, round, and reactive to light. Right eye exhibits no discharge. Left eye exhibits no discharge. Neck: Normal range of motion. Neck supple. No tracheal deviation present. No thyromegaly present. Cardiovascular: Normal rate, regular rhythm, normal heart sounds and intact distal pulses. Exam reveals no gallop and no friction rub. No murmur heard. Pulmonary/Chest: Effort normal and breath sounds normal. No respiratory distress. She has no wheezes. She exhibits no tenderness. Abdominal: Soft. Bowel sounds are normal. She exhibits no distension and no mass. There is no tenderness. There is no rebound and no guarding. Musculoskeletal: She exhibits no edema or tenderness. Lymphadenopathy:     She has no cervical adenopathy. Neurological: She is alert and oriented to person, place, and time. She has normal reflexes. No cranial nerve deficit. She exhibits normal muscle tone. Gait normal. Coordination normal.   Skin: Skin is warm and dry. Rash (Patient has her usual birthmark on the left side of her neck) noted. No erythema. No pallor. Psychiatric: Mood, memory, affect and judgment normal.         ICD-10-CM ICD-9-CM    1. Mild intermittent asthma with acute exacerbation J45.21 493.92 predniSONE (DELTASONE) 20 mg tablet   2. Lyme disease A69.20 088. 81 amoxicillin (AMOXIL) 500 mg capsule       Orders Placed This Encounter    amoxicillin (AMOXIL) 500 mg capsule     Sig: Take 2 Caps by mouth three (3) times daily.      Dispense:  60 Cap     Refill:  0    predniSONE (DELTASONE) 20 mg tablet     Sig: Take 5 tablets day one, take 4 tablets day two, take 3 tablets day three, take 2 tablets day four, take 1 tablet day five.      Dispense:  15 Tab     Refill:  0       Follow-up Disposition: Not on Sienna Gooden MD

## 2018-08-16 ENCOUNTER — TELEPHONE (OUTPATIENT)
Dept: FAMILY MEDICINE CLINIC | Age: 38
End: 2018-08-16

## 2018-08-16 RX ORDER — FLUCONAZOLE 150 MG/1
150 TABLET ORAL DAILY
Qty: 2 TAB | Refills: 2 | Status: SHIPPED | OUTPATIENT
Start: 2018-08-16 | End: 2018-08-18

## 2018-08-16 NOTE — TELEPHONE ENCOUNTER
Patient called and stated she was put on Amoxicillin  On 08/03/18. She now has a yeast infection and would like you to call in a Diflucan pill into [facility].

## 2019-04-04 PROBLEM — E66.01 SEVERE OBESITY (HCC): Status: ACTIVE | Noted: 2019-04-04

## 2020-12-28 ENCOUNTER — OFFICE VISIT (OUTPATIENT)
Dept: PRIMARY CARE CLINIC | Age: 40
End: 2020-12-28

## 2020-12-28 DIAGNOSIS — Z20.822 ENCOUNTER FOR LABORATORY TESTING FOR COVID-19 VIRUS: Primary | ICD-10-CM

## 2020-12-28 NOTE — PROGRESS NOTES
Pt presents to the flu clinic today requesting a covid test. Pt denies symptoms but has had a possible exposure. Pt declined to be seen by a doctor today.  VICTOR MANUEL

## 2020-12-30 LAB — SARS-COV-2, NAA: NOT DETECTED

## 2021-01-06 ENCOUNTER — OFFICE VISIT (OUTPATIENT)
Dept: PRIMARY CARE CLINIC | Age: 41
End: 2021-01-06

## 2021-01-06 DIAGNOSIS — Z20.822 ENCOUNTER FOR LABORATORY TESTING FOR COVID-19 VIRUS: Primary | ICD-10-CM

## 2021-01-08 LAB — SARS-COV-2, NAA: DETECTED

## 2021-01-08 NOTE — PROGRESS NOTES
2 pt identifiers verified name and , pt aware, states understands  Pt states starting to feel a little better, has cough and loss taste yesterday. Advised quarantine for 14 days, go to ER if becomes SOB.

## 2021-10-27 ENCOUNTER — OFFICE VISIT (OUTPATIENT)
Dept: FAMILY MEDICINE CLINIC | Age: 41
End: 2021-10-27
Payer: COMMERCIAL

## 2021-10-27 VITALS
OXYGEN SATURATION: 98 % | HEIGHT: 66 IN | RESPIRATION RATE: 18 BRPM | BODY MASS INDEX: 36.02 KG/M2 | DIASTOLIC BLOOD PRESSURE: 75 MMHG | WEIGHT: 224.13 LBS | HEART RATE: 97 BPM | TEMPERATURE: 99.2 F | SYSTOLIC BLOOD PRESSURE: 115 MMHG

## 2021-10-27 DIAGNOSIS — M32.9 SYSTEMIC LUPUS ERYTHEMATOSUS ARTHRITIS (HCC): ICD-10-CM

## 2021-10-27 DIAGNOSIS — J45.21 MILD INTERMITTENT ASTHMA WITH ACUTE EXACERBATION: Primary | ICD-10-CM

## 2021-10-27 DIAGNOSIS — E66.01 SEVERE OBESITY (HCC): ICD-10-CM

## 2021-10-27 DIAGNOSIS — M25.572 ACUTE BILATERAL ANKLE PAIN: ICD-10-CM

## 2021-10-27 DIAGNOSIS — R06.02 SOB (SHORTNESS OF BREATH): ICD-10-CM

## 2021-10-27 DIAGNOSIS — M25.571 ACUTE BILATERAL ANKLE PAIN: ICD-10-CM

## 2021-10-27 PROCEDURE — 99213 OFFICE O/P EST LOW 20 MIN: CPT | Performed by: FAMILY MEDICINE

## 2021-10-27 RX ORDER — ALBUTEROL SULFATE 90 UG/1
1 AEROSOL, METERED RESPIRATORY (INHALATION)
Qty: 1 EACH | Refills: 5 | Status: SHIPPED | OUTPATIENT
Start: 2021-10-27

## 2021-10-27 RX ORDER — HYDROXYCHLOROQUINE SULFATE 200 MG/1
200 TABLET, FILM COATED ORAL DAILY
Qty: 30 TABLET | Refills: 5 | Status: SHIPPED | OUTPATIENT
Start: 2021-10-27

## 2021-10-27 RX ORDER — BUDESONIDE AND FORMOTEROL FUMARATE DIHYDRATE 80; 4.5 UG/1; UG/1
2 AEROSOL RESPIRATORY (INHALATION) DAILY
Qty: 10.2 G | Refills: 5 | Status: SHIPPED | OUTPATIENT
Start: 2021-10-27

## 2021-10-27 RX ORDER — PREDNISONE 20 MG/1
TABLET ORAL
Qty: 30 TABLET | Refills: 0 | Status: SHIPPED | OUTPATIENT
Start: 2021-10-27

## 2021-10-27 RX ORDER — MONTELUKAST SODIUM 10 MG/1
10 TABLET ORAL DAILY
Qty: 30 TABLET | Refills: 5 | Status: SHIPPED | OUTPATIENT
Start: 2021-10-27

## 2021-10-27 NOTE — PROGRESS NOTES
1. Have you been to the ER, urgent care clinic since your last visit? Hospitalized since your last visit? No    2. Have you seen or consulted any other health care providers outside of the [address] [place] [address] since your last visit? Include any pap smears or colon screening.  No     Chief Complaint   Patient presents with    Asthma    Ankle swelling    Foot Swelling    Lupus     Visit Vitals  /75 (BP 1 Location: Left arm, BP Patient Position: Sitting)   Pulse 97   Temp 99.2 °F (37.3 °C) (Temporal)   Resp 18   Ht 5' 6\" (1.676 m)   Wt 224 lb 2 oz (101.7 kg)   SpO2 98%   BMI 36.17 kg/m²

## 2021-10-27 NOTE — PROGRESS NOTES
Major Llamas is a 38 y.o. female who presents with the following:  Chief Complaint   Patient presents with    Asthma    Ankle swelling    Foot Swelling    Lupus       Patient who is has systemic lupus erythematosus and recently has been having exacerbations of her asthma with shortness of breath and wheezing. From the lupus she seems to be having ankle swelling with warmth and tenderness in the ankle area with her foot swelling also bilaterally. The patient notes that when she was on hydroxychloroquine that she seemed to do fairly well with the lupus. No Known Allergies    Current Outpatient Medications   Medication Sig    budesonide-formoteroL (SYMBICORT) 80-4.5 mcg/actuation HFAA Take 2 Puffs by inhalation daily.  predniSONE (DELTASONE) 20 mg tablet 5 tablets day for days 1 through 4, then 4 tablets on day 5, then 3 tablets on day 6, then 2 tablets on day 7, then 1 tablet on day 8.    albuterol (PROVENTIL HFA, VENTOLIN HFA, PROAIR HFA) 90 mcg/actuation inhaler Take 1 Puff by inhalation every four (4) hours as needed for Wheezing.  montelukast (SINGULAIR) 10 mg tablet Take 1 Tablet by mouth daily.  hydrOXYchloroQUINE (PLAQUENIL) 200 mg tablet Take 1 Tablet by mouth daily.  norethindrone (MICRONOR) 0.35 mg tab Take 1 Tab by mouth daily.  cetirizine (ZYRTEC) 10 mg tablet Take 1 Tab by mouth daily. Indications: inflammation of the nose due to an allergy    fluticasone (FLONASE) 50 mcg/actuation nasal spray 2 Sprays by Both Nostrils route daily. No current facility-administered medications for this visit.        Past Medical History:   Diagnosis Date    Asthma     Migraines     Vertigo        Past Surgical History:   Procedure Laterality Date    HX BREAST REDUCTION         Family History   Problem Relation Age of Onset    Diabetes Brother     No Known Problems Mother     No Known Problems Father        Social History     Socioeconomic History    Marital status:  Spouse name: Not on file    Number of children: Not on file    Years of education: Not on file    Highest education level: Not on file   Tobacco Use    Smoking status: Never Smoker    Smokeless tobacco: Never Used   Substance and Sexual Activity    Alcohol use: No     Social Determinants of Health     Financial Resource Strain:     Difficulty of Paying Living Expenses:    Food Insecurity:     Worried About Running Out of Food in the Last Year:     [address] in the Last Year:    Transportation Needs:     Lack of Transportation (Medical):  Lack of Transportation (Non-Medical):    Physical Activity:     Days of Exercise per Week:     Minutes of Exercise per Session:    Stress:     Feeling of Stress :    Social Connections:     Frequency of Communication with Friends and Family:     Frequency of Social Gatherings with Friends and Family:     Attends Faith Services:     Active Member of Clubs or Organizations:     Attends Club or Organization Meetings:     Marital Status:        Review of Systems   Constitutional: Negative for chills, fever, malaise/fatigue and weight loss. HENT: Negative for congestion, hearing loss, sore throat and tinnitus. Eyes: Negative for blurred vision, pain and discharge. Respiratory: Positive for shortness of breath and wheezing. Negative for cough. Cardiovascular: Negative for chest pain, palpitations, orthopnea, claudication and leg swelling. Gastrointestinal: Negative for abdominal pain, constipation and heartburn. Genitourinary: Negative for dysuria, frequency and urgency. Musculoskeletal: Positive for joint pain. Negative for falls and myalgias. Skin: Positive for rash. Negative for itching. Neurological: Negative for dizziness, tingling, tremors and headaches. Endo/Heme/Allergies: Negative for environmental allergies and polydipsia. Psychiatric/Behavioral: Negative for depression and substance abuse.  The patient is not nervous/anxious. Visit Vitals  /75 (BP 1 Location: Left arm, BP Patient Position: Sitting)   Pulse 97   Temp 99.2 °F (37.3 °C) (Temporal)   Resp 18   Ht 5' 6\" (1.676 m)   Wt 224 lb 2 oz (101.7 kg)   SpO2 98%   BMI 36.17 kg/m²     Physical Exam  Vitals reviewed. Constitutional:       General: She is not in acute distress. Appearance: Normal appearance. She is obese. She is not ill-appearing. HENT:      Head: Normocephalic and atraumatic. Right Ear: Tympanic membrane, ear canal and external ear normal.      Left Ear: Tympanic membrane, ear canal and external ear normal.      Nose: Nose normal. No congestion or rhinorrhea. Mouth/Throat:      Mouth: Mucous membranes are moist.      Pharynx: No posterior oropharyngeal erythema. Eyes:      General:         Right eye: No discharge. Left eye: No discharge. Extraocular Movements: Extraocular movements intact. Conjunctiva/sclera: Conjunctivae normal.      Pupils: Pupils are equal, round, and reactive to light. Comments: Cornea anterior chamber and iris are normal.   Neck:      Trachea: No tracheal deviation. Cardiovascular:      Rate and Rhythm: Normal rate and regular rhythm. Pulses: Normal pulses. Heart sounds: Normal heart sounds. No murmur heard. No friction rub. No gallop. Pulmonary:      Effort: Pulmonary effort is normal. No respiratory distress. Breath sounds: Normal breath sounds. No wheezing or rhonchi. Chest:      Chest wall: No tenderness. Abdominal:      General: Bowel sounds are normal. There is no distension. Palpations: Abdomen is soft. There is no mass. Tenderness: There is no abdominal tenderness. There is no guarding or rebound. Musculoskeletal:         General: Swelling and tenderness present. No deformity. Cervical back: Normal range of motion and neck supple. Right lower leg: No edema. Left lower leg: No edema.       Comments: Patient is having some swelling and tenderness and warmth in the ankle joints bilaterally but there is no edema. Lymphadenopathy:      Cervical: No cervical adenopathy. Skin:     General: Skin is warm and dry. Coloration: Skin is not pale. Findings: No erythema or rash. Neurological:      General: No focal deficit present. Mental Status: She is alert and oriented to person, place, and time. Cranial Nerves: No cranial nerve deficit. Motor: No abnormal muscle tone. Deep Tendon Reflexes: Reflexes are normal and symmetric. Reflexes normal.      Comments: Cranial nerves II through XII are intact sensory and motor. Biceps triceps knee and ankle DTRs are normal and symmetrical.   Psychiatric:         Mood and Affect: Mood normal.         Behavior: Behavior normal.         Thought Content: Thought content normal.         Judgment: Judgment normal.       Patient understands she needs to have her eyes examined by an ophthalmologist at least every 6 months. ICD-10-CM ICD-9-CM    1. Mild intermittent asthma with acute exacerbation  J45.21 493.92 budesonide-formoteroL (SYMBICORT) 80-4.5 mcg/actuation HFAA      predniSONE (DELTASONE) 20 mg tablet      albuterol (PROVENTIL HFA, VENTOLIN HFA, PROAIR HFA) 90 mcg/actuation inhaler      montelukast (SINGULAIR) 10 mg tablet   2. Severe obesity ([address]  E66.01 278.01    3. Acute bilateral ankle pain  M25.571 719.47     M25.572 338.19    4. Systemic lupus erythematosus arthritis (HCC)  M32.9 710.0 hydrOXYchloroQUINE (PLAQUENIL) 200 mg tablet   5. SOB (shortness of breath)  R06.02 786.05 albuterol (PROVENTIL HFA, VENTOLIN HFA, PROAIR HFA) 90 mcg/actuation inhaler       Orders Placed This Encounter    budesonide-formoteroL (SYMBICORT) 80-4.5 mcg/actuation HFAA     Sig: Take 2 Puffs by inhalation daily.      Dispense:  10.2 g     Refill:  5    predniSONE (DELTASONE) 20 mg tablet     Si tablets day for days 1 through 4, then 4 tablets on day 5, then 3 tablets on day 6, then 2 tablets on day 7, then 1 tablet on day 8. Dispense:  30 Tablet     Refill:  0    albuterol (PROVENTIL HFA, VENTOLIN HFA, PROAIR HFA) 90 mcg/actuation inhaler     Sig: Take 1 Puff by inhalation every four (4) hours as needed for Wheezing. Dispense:  1 Each     Refill:  5    montelukast (SINGULAIR) 10 mg tablet     Sig: Take 1 Tablet by mouth daily. Dispense:  30 Tablet     Refill:  5    hydrOXYchloroQUINE (PLAQUENIL) 200 mg tablet     Sig: Take 1 Tablet by mouth daily.      Dispense:  30 Tablet     Refill:  5           Malena Townsend MD

## 2022-03-19 PROBLEM — M50.90 CERVICAL DISC DISEASE: Status: ACTIVE | Noted: 2018-07-26

## 2022-03-19 PROBLEM — J45.21 MILD INTERMITTENT ASTHMA WITH ACUTE EXACERBATION: Status: ACTIVE | Noted: 2018-08-03

## 2022-03-19 PROBLEM — M25.571 ACUTE BILATERAL ANKLE PAIN: Status: ACTIVE | Noted: 2021-10-27

## 2022-03-19 PROBLEM — M54.2 NECK PAIN: Status: ACTIVE | Noted: 2018-07-26

## 2022-03-19 PROBLEM — M25.572 ACUTE BILATERAL ANKLE PAIN: Status: ACTIVE | Noted: 2021-10-27

## 2022-03-19 PROBLEM — A69.20 LYME DISEASE: Status: ACTIVE | Noted: 2018-08-03

## 2022-03-20 PROBLEM — E66.01 SEVERE OBESITY (HCC): Status: ACTIVE | Noted: 2019-04-04

## 2022-03-20 PROBLEM — M32.9 SYSTEMIC LUPUS ERYTHEMATOSUS ARTHRITIS (HCC): Status: ACTIVE | Noted: 2021-10-27

## 2023-05-11 RX ORDER — MONTELUKAST SODIUM 10 MG/1
1 TABLET ORAL DAILY
COMMUNITY
Start: 2021-10-27

## 2023-05-11 RX ORDER — CETIRIZINE HYDROCHLORIDE 10 MG/1
TABLET ORAL DAILY
COMMUNITY
Start: 2019-04-04

## 2023-05-11 RX ORDER — ACETAMINOPHEN AND CODEINE PHOSPHATE 120; 12 MG/5ML; MG/5ML
1 SOLUTION ORAL DAILY
COMMUNITY
Start: 2019-01-29

## 2023-05-11 RX ORDER — PREDNISONE 20 MG/1
TABLET ORAL
COMMUNITY
Start: 2021-10-27

## 2023-05-11 RX ORDER — HYDROXYCHLOROQUINE SULFATE 200 MG/1
1 TABLET, FILM COATED ORAL DAILY
COMMUNITY
Start: 2021-10-27

## 2023-05-11 RX ORDER — ALBUTEROL SULFATE 90 UG/1
1 AEROSOL, METERED RESPIRATORY (INHALATION) EVERY 4 HOURS PRN
COMMUNITY
Start: 2021-10-27

## 2023-05-11 RX ORDER — FLUTICASONE PROPIONATE 50 MCG
2 SPRAY, SUSPENSION (ML) NASAL DAILY
COMMUNITY
Start: 2016-09-22

## 2023-10-17 SDOH — ECONOMIC STABILITY: FOOD INSECURITY: WITHIN THE PAST 12 MONTHS, YOU WORRIED THAT YOUR FOOD WOULD RUN OUT BEFORE YOU GOT MONEY TO BUY MORE.: NEVER TRUE

## 2023-10-17 SDOH — ECONOMIC STABILITY: INCOME INSECURITY: HOW HARD IS IT FOR YOU TO PAY FOR THE VERY BASICS LIKE FOOD, HOUSING, MEDICAL CARE, AND HEATING?: NOT VERY HARD

## 2023-10-17 SDOH — ECONOMIC STABILITY: HOUSING INSECURITY
IN THE LAST 12 MONTHS, WAS THERE A TIME WHEN YOU DID NOT HAVE A STEADY PLACE TO SLEEP OR SLEPT IN A SHELTER (INCLUDING NOW)?: NO

## 2023-10-17 SDOH — ECONOMIC STABILITY: TRANSPORTATION INSECURITY
IN THE PAST 12 MONTHS, HAS LACK OF TRANSPORTATION KEPT YOU FROM MEETINGS, WORK, OR FROM GETTING THINGS NEEDED FOR DAILY LIVING?: NO

## 2023-10-17 SDOH — ECONOMIC STABILITY: FOOD INSECURITY: WITHIN THE PAST 12 MONTHS, THE FOOD YOU BOUGHT JUST DIDN'T LAST AND YOU DIDN'T HAVE MONEY TO GET MORE.: NEVER TRUE

## 2023-10-19 ENCOUNTER — OFFICE VISIT (OUTPATIENT)
Age: 43
End: 2023-10-19
Payer: COMMERCIAL

## 2023-10-19 VITALS
TEMPERATURE: 98.4 F | BODY MASS INDEX: 36.96 KG/M2 | HEART RATE: 74 BPM | SYSTOLIC BLOOD PRESSURE: 121 MMHG | DIASTOLIC BLOOD PRESSURE: 76 MMHG | WEIGHT: 230 LBS | RESPIRATION RATE: 20 BRPM | OXYGEN SATURATION: 99 % | HEIGHT: 66 IN

## 2023-10-19 DIAGNOSIS — R20.2 TINGLING IN EXTREMITIES: ICD-10-CM

## 2023-10-19 DIAGNOSIS — R53.82 CHRONIC FATIGUE: ICD-10-CM

## 2023-10-19 DIAGNOSIS — M25.562 CHRONIC PAIN OF LEFT KNEE: ICD-10-CM

## 2023-10-19 DIAGNOSIS — E16.2 HYPOGLYCEMIA: Primary | ICD-10-CM

## 2023-10-19 DIAGNOSIS — Z13.220 LIPID SCREENING: ICD-10-CM

## 2023-10-19 DIAGNOSIS — E66.09 CLASS 2 OBESITY DUE TO EXCESS CALORIES WITHOUT SERIOUS COMORBIDITY WITH BODY MASS INDEX (BMI) OF 37.0 TO 37.9 IN ADULT: ICD-10-CM

## 2023-10-19 DIAGNOSIS — F41.9 ANXIETY: ICD-10-CM

## 2023-10-19 DIAGNOSIS — G57.13 MERALGIA PARESTHETICA OF BOTH LOWER EXTREMITIES: ICD-10-CM

## 2023-10-19 DIAGNOSIS — G89.29 CHRONIC PAIN OF LEFT KNEE: ICD-10-CM

## 2023-10-19 DIAGNOSIS — G56.03 BILATERAL CARPAL TUNNEL SYNDROME: ICD-10-CM

## 2023-10-19 DIAGNOSIS — Z12.31 ENCOUNTER FOR SCREENING MAMMOGRAM FOR BREAST CANCER: ICD-10-CM

## 2023-10-19 DIAGNOSIS — G56.03 CARPAL TUNNEL SYNDROME, BILATERAL: ICD-10-CM

## 2023-10-19 PROBLEM — E03.8 TSH (THYROID-STIMULATING HORMONE DEFICIENCY): Status: ACTIVE | Noted: 2023-10-19

## 2023-10-19 LAB
ALBUMIN SERPL-MCNC: 3.9 G/DL (ref 3.5–5)
ALBUMIN/GLOB SERPL: 1.2 (ref 1.1–2.2)
ALP SERPL-CCNC: 72 U/L (ref 45–117)
ALT SERPL-CCNC: 25 U/L (ref 12–78)
ANION GAP SERPL CALC-SCNC: 5 MMOL/L (ref 5–15)
AST SERPL-CCNC: 10 U/L (ref 15–37)
BILIRUB SERPL-MCNC: 0.5 MG/DL (ref 0.2–1)
BUN SERPL-MCNC: 14 MG/DL (ref 6–20)
BUN/CREAT SERPL: 16 (ref 12–20)
CALCIUM SERPL-MCNC: 9.4 MG/DL (ref 8.5–10.1)
CHLORIDE SERPL-SCNC: 108 MMOL/L (ref 97–108)
CHOLEST SERPL-MCNC: 172 MG/DL
CO2 SERPL-SCNC: 26 MMOL/L (ref 21–32)
CREAT SERPL-MCNC: 0.86 MG/DL (ref 0.55–1.02)
ERYTHROCYTE [DISTWIDTH] IN BLOOD BY AUTOMATED COUNT: 14.6 % (ref 11.5–14.5)
GLOBULIN SER CALC-MCNC: 3.3 G/DL (ref 2–4)
GLUCOSE SERPL-MCNC: 87 MG/DL (ref 65–100)
HCT VFR BLD AUTO: 45.7 % (ref 35–47)
HDLC SERPL-MCNC: 40 MG/DL
HDLC SERPL: 4.3 (ref 0–5)
HGB BLD-MCNC: 13.9 G/DL (ref 11.5–16)
LDLC SERPL CALC-MCNC: 114.2 MG/DL (ref 0–100)
MCH RBC QN AUTO: 26.6 PG (ref 26–34)
MCHC RBC AUTO-ENTMCNC: 30.4 G/DL (ref 30–36.5)
MCV RBC AUTO: 87.4 FL (ref 80–99)
NRBC # BLD: 0 K/UL (ref 0–0.01)
NRBC BLD-RTO: 0 PER 100 WBC
PLATELET # BLD AUTO: 286 K/UL (ref 150–400)
PMV BLD AUTO: 10 FL (ref 8.9–12.9)
POTASSIUM SERPL-SCNC: 4.5 MMOL/L (ref 3.5–5.1)
PROT SERPL-MCNC: 7.2 G/DL (ref 6.4–8.2)
RBC # BLD AUTO: 5.23 M/UL (ref 3.8–5.2)
SODIUM SERPL-SCNC: 139 MMOL/L (ref 136–145)
TRIGL SERPL-MCNC: 89 MG/DL
TSH SERPL DL<=0.05 MIU/L-ACNC: 2.13 UIU/ML (ref 0.36–3.74)
VLDLC SERPL CALC-MCNC: 17.8 MG/DL
WBC # BLD AUTO: 7.1 K/UL (ref 3.6–11)

## 2023-10-19 PROCEDURE — 36415 COLL VENOUS BLD VENIPUNCTURE: CPT | Performed by: FAMILY MEDICINE

## 2023-10-19 PROCEDURE — 99213 OFFICE O/P EST LOW 20 MIN: CPT | Performed by: FAMILY MEDICINE

## 2023-10-19 ASSESSMENT — ENCOUNTER SYMPTOMS
DIARRHEA: 0
ANAL BLEEDING: 0
SINUS PRESSURE: 0
ABDOMINAL PAIN: 0
FACIAL SWELLING: 0
BLOOD IN STOOL: 0
EYE REDNESS: 0
COLOR CHANGE: 0
CONSTIPATION: 0
VOICE CHANGE: 0
WHEEZING: 0
BACK PAIN: 0
EYE PAIN: 0
SHORTNESS OF BREATH: 0
CHEST TIGHTNESS: 0
RHINORRHEA: 0
SORE THROAT: 0
NAUSEA: 0
COUGH: 0
ABDOMINAL DISTENTION: 0

## 2023-10-19 ASSESSMENT — ANXIETY QUESTIONNAIRES
IF YOU CHECKED OFF ANY PROBLEMS ON THIS QUESTIONNAIRE, HOW DIFFICULT HAVE THESE PROBLEMS MADE IT FOR YOU TO DO YOUR WORK, TAKE CARE OF THINGS AT HOME, OR GET ALONG WITH OTHER PEOPLE: SOMEWHAT DIFFICULT
6. BECOMING EASILY ANNOYED OR IRRITABLE: 0
1. FEELING NERVOUS, ANXIOUS, OR ON EDGE: 1
3. WORRYING TOO MUCH ABOUT DIFFERENT THINGS: 1
GAD7 TOTAL SCORE: 5
7. FEELING AFRAID AS IF SOMETHING AWFUL MIGHT HAPPEN: 0
5. BEING SO RESTLESS THAT IT IS HARD TO SIT STILL: 1
2. NOT BEING ABLE TO STOP OR CONTROL WORRYING: 1
4. TROUBLE RELAXING: 1

## 2023-10-19 ASSESSMENT — PATIENT HEALTH QUESTIONNAIRE - PHQ9
SUM OF ALL RESPONSES TO PHQ QUESTIONS 1-9: 0
2. FEELING DOWN, DEPRESSED OR HOPELESS: 0
SUM OF ALL RESPONSES TO PHQ QUESTIONS 1-9: 0
1. LITTLE INTEREST OR PLEASURE IN DOING THINGS: 0
SUM OF ALL RESPONSES TO PHQ9 QUESTIONS 1 & 2: 0
SUM OF ALL RESPONSES TO PHQ QUESTIONS 1-9: 0
SUM OF ALL RESPONSES TO PHQ QUESTIONS 1-9: 0

## 2023-10-19 NOTE — PROGRESS NOTES
Stuart Pizaror is a 36 y.o. female who presents with the following:  Chief Complaint   Patient presents with    Bleeding/Bruising     Bruises easily    Anxiety    Tingling in extremities       Patient who has a history of lupus erythematosus but is currently only on norethandrolone and has albuterol available to her for her asthma when it acts up comes in complaining of easy bruising and bleeding but states that her menses are light while she is on the norethindrone. Patient reports that she has been somewhat anxious over her symptoms because she is also had symptoms of hypoglycemia where she felt very shaky and hungry and this was relieved when she ate something but her family is full of diabetes and the patient is severely overweight with a BMI of 37.12. Patient also reports tingling in her extremities particularly the lateral thighs and in her hands but she does have a history of carpal tunnel which was confirmed by nerve velocity testing. The patient does have a large pannus that hangs down over the thighs superiorly and could be pinching on a cutaneous nerve bilaterally. The patient has a history of mild intermittent asthma without any exacerbation recently and she does have a history of cervical disc disease and has a past history of Lyme disease patient has chronic fatigue. Patient has chronic knee pain particularly in the left knee. No Known Allergies    Current Outpatient Medications   Medication Sig Dispense Refill    albuterol sulfate HFA (PROVENTIL;VENTOLIN;PROAIR) 108 (90 Base) MCG/ACT inhaler Inhale 1 puff into the lungs every 4 hours as needed      norethindrone (MICRONOR) 0.35 MG tablet Take 1 tablet by mouth daily       No current facility-administered medications for this visit.         Past Medical History:   Diagnosis Date    Asthma     Migraines     Vertigo        Past Surgical History:   Procedure Laterality Date    BREAST REDUCTION SURGERY         Family History   Problem Relation

## 2023-11-10 ENCOUNTER — OFFICE VISIT (OUTPATIENT)
Age: 43
End: 2023-11-10
Payer: COMMERCIAL

## 2023-11-10 VITALS
SYSTOLIC BLOOD PRESSURE: 124 MMHG | OXYGEN SATURATION: 99 % | TEMPERATURE: 98.4 F | DIASTOLIC BLOOD PRESSURE: 82 MMHG | BODY MASS INDEX: 37.45 KG/M2 | RESPIRATION RATE: 18 BRPM | WEIGHT: 233 LBS | HEART RATE: 86 BPM | HEIGHT: 66 IN

## 2023-11-10 DIAGNOSIS — M89.8X7 PAIN IN METATARSUS OF LEFT FOOT: Primary | ICD-10-CM

## 2023-11-10 PROCEDURE — 99213 OFFICE O/P EST LOW 20 MIN: CPT | Performed by: FAMILY MEDICINE

## 2023-11-10 SDOH — ECONOMIC STABILITY: FOOD INSECURITY: WITHIN THE PAST 12 MONTHS, YOU WORRIED THAT YOUR FOOD WOULD RUN OUT BEFORE YOU GOT MONEY TO BUY MORE.: NEVER TRUE

## 2023-11-10 SDOH — ECONOMIC STABILITY: FOOD INSECURITY: WITHIN THE PAST 12 MONTHS, THE FOOD YOU BOUGHT JUST DIDN'T LAST AND YOU DIDN'T HAVE MONEY TO GET MORE.: NEVER TRUE

## 2023-11-10 SDOH — ECONOMIC STABILITY: INCOME INSECURITY: HOW HARD IS IT FOR YOU TO PAY FOR THE VERY BASICS LIKE FOOD, HOUSING, MEDICAL CARE, AND HEATING?: NOT HARD AT ALL

## 2023-11-10 ASSESSMENT — ENCOUNTER SYMPTOMS
SINUS PRESSURE: 0
SORE THROAT: 0
FACIAL SWELLING: 0
EYE PAIN: 0
CONSTIPATION: 0
ANAL BLEEDING: 0
COLOR CHANGE: 0
ABDOMINAL PAIN: 0
ABDOMINAL DISTENTION: 0
EYE REDNESS: 0
RHINORRHEA: 0
WHEEZING: 0
SHORTNESS OF BREATH: 0
BACK PAIN: 0
VOICE CHANGE: 0
DIARRHEA: 0
COUGH: 0
CHEST TIGHTNESS: 0
BLOOD IN STOOL: 0
NAUSEA: 0

## 2023-11-10 ASSESSMENT — ANXIETY QUESTIONNAIRES
1. FEELING NERVOUS, ANXIOUS, OR ON EDGE: 0
GAD7 TOTAL SCORE: 0
4. TROUBLE RELAXING: 0
3. WORRYING TOO MUCH ABOUT DIFFERENT THINGS: 0
6. BECOMING EASILY ANNOYED OR IRRITABLE: 0
5. BEING SO RESTLESS THAT IT IS HARD TO SIT STILL: 0
2. NOT BEING ABLE TO STOP OR CONTROL WORRYING: 0
7. FEELING AFRAID AS IF SOMETHING AWFUL MIGHT HAPPEN: 0
IF YOU CHECKED OFF ANY PROBLEMS ON THIS QUESTIONNAIRE, HOW DIFFICULT HAVE THESE PROBLEMS MADE IT FOR YOU TO DO YOUR WORK, TAKE CARE OF THINGS AT HOME, OR GET ALONG WITH OTHER PEOPLE: NOT DIFFICULT AT ALL

## 2023-11-10 ASSESSMENT — PATIENT HEALTH QUESTIONNAIRE - PHQ9
2. FEELING DOWN, DEPRESSED OR HOPELESS: 0
SUM OF ALL RESPONSES TO PHQ9 QUESTIONS 1 & 2: 0
SUM OF ALL RESPONSES TO PHQ QUESTIONS 1-9: 0
1. LITTLE INTEREST OR PLEASURE IN DOING THINGS: 0

## 2023-11-10 NOTE — PROGRESS NOTES
normal.           Diagnosis Orders   1. Pain in metatarsus of left foot        Patient was instructed to keep cotton or either a piece of a cotton shirt between the 2 toes and tracy taped them together for the next 3 months and it may heal up. If that does not work the next step would be to send her to a podiatrist for surgery. No orders of the defined types were placed in this encounter. No follow-ups on file.      Donald Membreno MD

## 2025-01-10 ENCOUNTER — TRANSCRIBE ORDERS (OUTPATIENT)
Facility: HOSPITAL | Age: 45
End: 2025-01-10

## 2025-01-10 DIAGNOSIS — Z12.31 SCREENING MAMMOGRAM FOR BREAST CANCER: Primary | ICD-10-CM

## 2025-01-22 ENCOUNTER — HOSPITAL ENCOUNTER (OUTPATIENT)
Facility: HOSPITAL | Age: 45
Discharge: HOME | End: 2025-01-25
Payer: COMMERCIAL

## 2025-01-22 DIAGNOSIS — Z12.31 SCREENING MAMMOGRAM FOR BREAST CANCER: ICD-10-CM

## 2025-01-22 PROCEDURE — 77063 BREAST TOMOSYNTHESIS BI: CPT

## 2025-01-30 ENCOUNTER — HOSPITAL ENCOUNTER (OUTPATIENT)
Facility: HOSPITAL | Age: 45
Discharge: HOME | End: 2025-01-30
Attending: STUDENT IN AN ORGANIZED HEALTH CARE EDUCATION/TRAINING PROGRAM
Payer: COMMERCIAL

## 2025-01-30 DIAGNOSIS — R92.8 ABNORMAL MAMMOGRAM OF RIGHT BREAST: ICD-10-CM

## 2025-01-30 PROCEDURE — 76642 ULTRASOUND BREAST LIMITED: CPT
